# Patient Record
Sex: MALE | Race: ASIAN | NOT HISPANIC OR LATINO | Employment: FULL TIME | URBAN - METROPOLITAN AREA
[De-identification: names, ages, dates, MRNs, and addresses within clinical notes are randomized per-mention and may not be internally consistent; named-entity substitution may affect disease eponyms.]

---

## 2020-10-02 ENCOUNTER — RECORDS - HEALTHEAST (OUTPATIENT)
Dept: GENERAL RADIOLOGY | Facility: CLINIC | Age: 50
End: 2020-10-02

## 2020-10-02 ENCOUNTER — OFFICE VISIT - HEALTHEAST (OUTPATIENT)
Dept: FAMILY MEDICINE | Facility: CLINIC | Age: 50
End: 2020-10-02

## 2020-10-02 DIAGNOSIS — J45.901 EXACERBATION OF ASTHMA, UNSPECIFIED ASTHMA SEVERITY, UNSPECIFIED WHETHER PERSISTENT: ICD-10-CM

## 2020-10-02 DIAGNOSIS — R03.0 ELEVATED BLOOD PRESSURE READING WITHOUT DIAGNOSIS OF HYPERTENSION: ICD-10-CM

## 2020-10-02 DIAGNOSIS — Z23 NEED FOR PROPHYLACTIC VACCINATION AND INOCULATION AGAINST INFLUENZA: ICD-10-CM

## 2020-10-02 DIAGNOSIS — R07.89 OTHER CHEST PAIN: ICD-10-CM

## 2020-10-02 DIAGNOSIS — Z00.00 HEALTHCARE MAINTENANCE: ICD-10-CM

## 2020-10-02 DIAGNOSIS — R07.89 ATYPICAL CHEST PAIN: ICD-10-CM

## 2020-10-02 LAB
ALBUMIN UR-MCNC: NEGATIVE MG/DL
ANION GAP SERPL CALCULATED.3IONS-SCNC: 13 MMOL/L (ref 5–18)
APPEARANCE UR: CLEAR
BILIRUB UR QL STRIP: NEGATIVE
BUN SERPL-MCNC: 11 MG/DL (ref 8–22)
CALCIUM SERPL-MCNC: 9.5 MG/DL (ref 8.5–10.5)
CHLORIDE BLD-SCNC: 104 MMOL/L (ref 98–107)
CHOLEST SERPL-MCNC: 215 MG/DL
CO2 SERPL-SCNC: 23 MMOL/L (ref 22–31)
COLOR UR AUTO: YELLOW
CREAT SERPL-MCNC: 0.75 MG/DL (ref 0.7–1.3)
D DIMER PPP FEU-MCNC: <=0.27 FEU UG/ML
ERYTHROCYTE [DISTWIDTH] IN BLOOD BY AUTOMATED COUNT: 11.6 % (ref 11–14.5)
ERYTHROCYTE [SEDIMENTATION RATE] IN BLOOD BY WESTERGREN METHOD: 2 MM/HR (ref 0–15)
FASTING STATUS PATIENT QL REPORTED: YES
GFR SERPL CREATININE-BSD FRML MDRD: >60 ML/MIN/1.73M2
GLUCOSE BLD-MCNC: 102 MG/DL (ref 70–125)
GLUCOSE UR STRIP-MCNC: NEGATIVE MG/DL
HCT VFR BLD AUTO: 45.4 % (ref 40–54)
HDLC SERPL-MCNC: 41 MG/DL
HGB BLD-MCNC: 14.8 G/DL (ref 14–18)
HGB UR QL STRIP: NEGATIVE
HIV 1+2 AB+HIV1 P24 AG SERPL QL IA: NEGATIVE
KETONES UR STRIP-MCNC: NEGATIVE MG/DL
LEUKOCYTE ESTERASE UR QL STRIP: NEGATIVE
MCH RBC QN AUTO: 31.9 PG (ref 27–34)
MCHC RBC AUTO-ENTMCNC: 32.7 G/DL (ref 32–36)
MCV RBC AUTO: 98 FL (ref 80–100)
NITRATE UR QL: NEGATIVE
PH UR STRIP: 7 [PH] (ref 5–8)
PLATELET # BLD AUTO: 255 THOU/UL (ref 140–440)
PMV BLD AUTO: 8.2 FL (ref 7–10)
POTASSIUM BLD-SCNC: 3.9 MMOL/L (ref 3.5–5)
RBC # BLD AUTO: 4.65 MILL/UL (ref 4.4–6.2)
SODIUM SERPL-SCNC: 140 MMOL/L (ref 136–145)
SP GR UR STRIP: 1.02 (ref 1–1.03)
TROPONIN I SERPL-MCNC: <0.01 NG/ML (ref 0–0.29)
UROBILINOGEN UR STRIP-ACNC: NORMAL
WBC: 6.3 THOU/UL (ref 4–11)

## 2020-10-02 ASSESSMENT — MIFFLIN-ST. JEOR: SCORE: 1616.5

## 2020-10-02 NOTE — ASSESSMENT & PLAN NOTE
New diagnosis moderatehypertension    Factors/lifestyle that may contribute: None identified  Assessment of risk of vascular disease: Unclear at this time  Labs to work up secondary causes done today?  Yes  EKG done today or recently? Yes: Was normal  Plan:   Discussed diet and exercise: No inadequate time medication prescribed this visit? Yes: Amlodipine 5 mg  Rx writtenfor home blood pressure monitor? No  Follow-up: 2weeks

## 2020-10-02 NOTE — ASSESSMENT & PLAN NOTE
Etiology of chest pain is not known  Broad differential diagnosis includes:  Cardiac ischemia: low on sharp nature and pleuritic  PE: Intermediate likelihood: Strategy is d-dimer, plan PE pulmonary angiogram if elevated  Chest wall/ musculoskeletal: high/likelihood: Although not reproducible  Gastrointestinal: lowlikelihood: Based on symptoms  Anxiety: lowlikelihood: Based on symptoms    Other: (pericarditis, thoracic aortic dissection, CHF, other pulmonary sources); low likelihood as they do not fit clinical presentation well   Plan: Chest x-ray, EKG, troponin, d-dimer done today.  Await results.  Recommend analgesic.  Follow-up 2 weeks.

## 2020-10-05 LAB
HAV IGM SERPL QL IA: NEGATIVE
HBV CORE IGM SERPL QL IA: NEGATIVE
HBV SURFACE AG SERPL QL IA: NEGATIVE
HCV AB SERPL QL IA: NEGATIVE

## 2020-10-06 ENCOUNTER — COMMUNICATION - HEALTHEAST (OUTPATIENT)
Dept: FAMILY MEDICINE | Facility: CLINIC | Age: 50
End: 2020-10-06

## 2020-10-06 LAB
ATRIAL RATE - MUSE: 67 BPM
DIASTOLIC BLOOD PRESSURE - MUSE: NORMAL
INTERPRETATION ECG - MUSE: NORMAL
P AXIS - MUSE: 60 DEGREES
PR INTERVAL - MUSE: 226 MS
QRS DURATION - MUSE: 88 MS
QT - MUSE: 408 MS
QTC - MUSE: 431 MS
R AXIS - MUSE: 66 DEGREES
SYSTOLIC BLOOD PRESSURE - MUSE: NORMAL
T AXIS - MUSE: 43 DEGREES
VENTRICULAR RATE- MUSE: 67 BPM

## 2020-10-26 ENCOUNTER — AMBULATORY - HEALTHEAST (OUTPATIENT)
Dept: NURSING | Facility: CLINIC | Age: 50
End: 2020-10-26

## 2020-10-26 ENCOUNTER — COMMUNICATION - HEALTHEAST (OUTPATIENT)
Dept: FAMILY MEDICINE | Facility: CLINIC | Age: 50
End: 2020-10-26

## 2020-10-26 DIAGNOSIS — J45.901 EXACERBATION OF ASTHMA, UNSPECIFIED ASTHMA SEVERITY, UNSPECIFIED WHETHER PERSISTENT: ICD-10-CM

## 2020-10-26 DIAGNOSIS — R03.0 ELEVATED BLOOD PRESSURE READING WITHOUT DIAGNOSIS OF HYPERTENSION: ICD-10-CM

## 2020-10-26 RX ORDER — ALBUTEROL SULFATE 90 UG/1
2 AEROSOL, METERED RESPIRATORY (INHALATION) EVERY 6 HOURS PRN
Qty: 1 EACH | Refills: 3 | Status: SHIPPED | OUTPATIENT
Start: 2020-10-26

## 2021-04-25 ENCOUNTER — COMMUNICATION - HEALTHEAST (OUTPATIENT)
Dept: FAMILY MEDICINE | Facility: CLINIC | Age: 51
End: 2021-04-25

## 2021-04-25 DIAGNOSIS — R03.0 ELEVATED BLOOD PRESSURE READING WITHOUT DIAGNOSIS OF HYPERTENSION: ICD-10-CM

## 2021-04-26 RX ORDER — AMLODIPINE BESYLATE 5 MG/1
TABLET ORAL
Qty: 30 TABLET | Refills: 6 | Status: SHIPPED | OUTPATIENT
Start: 2021-04-26

## 2021-05-27 VITALS — SYSTOLIC BLOOD PRESSURE: 126 MMHG | DIASTOLIC BLOOD PRESSURE: 82 MMHG | HEART RATE: 80 BPM

## 2021-06-05 VITALS
WEIGHT: 177 LBS | DIASTOLIC BLOOD PRESSURE: 94 MMHG | RESPIRATION RATE: 20 BRPM | TEMPERATURE: 97.7 F | SYSTOLIC BLOOD PRESSURE: 152 MMHG | OXYGEN SATURATION: 98 % | HEIGHT: 67 IN | BODY MASS INDEX: 27.78 KG/M2 | HEART RATE: 77 BPM

## 2021-06-12 NOTE — PROGRESS NOTES
Assessment/ Plan  Problem List Items Addressed This Visit        Unprioritized    Atypical chest pain - Primary     Etiology of chest pain is not known  Broad differential diagnosis includes:  Cardiac ischemia: low on sharp nature and pleuritic  PE: Intermediate likelihood: Strategy is d-dimer, plan PE pulmonary angiogram if elevated  Chest wall/ musculoskeletal: high/likelihood: Although not reproducible  Gastrointestinal: lowlikelihood: Based on symptoms  Anxiety: lowlikelihood: Based on symptoms    Other: (pericarditis, thoracic aortic dissection, CHF, other pulmonary sources); low likelihood as they do not fit clinical presentation well   Plan: Chest x-ray, EKG, troponin, d-dimer done today.  Await results.  Recommend analgesic.  Follow-up 2 weeks.             Relevant Orders    D-dimer, Quantitative    Erythrocyte Sedimentation Rate    HM2(CBC w/o Differential) (Completed)    Troponin I    XR Chest 2 Views (Completed)    Electrocardiogram Perform - Clinic (Completed)    Urinalysis-UC if Indicated (Completed)    Elevated blood pressure reading without diagnosis of hypertension       New diagnosis moderatehypertension    Factors/lifestyle that may contribute: None identified  Assessment of risk of vascular disease: Unclear at this time  Labs to work up secondary causes done today?  Yes  EKG done today or recently? Yes: Was normal  Plan:   Discussed diet and exercise: No inadequate time medication prescribed this visit? Yes: Amlodipine 5 mg  Rx written for home blood pressure monitor? No  Follow-up: 2weeks                 Relevant Medications    amLODIPine (NORVASC) 5 MG tablet    Other Relevant Orders    Basic Metabolic Panel    Electrocardiogram Perform - Clinic (Completed)    Urinalysis-UC if Indicated (Completed)    Healthcare maintenance     IV, influenza vaccine given tetanus shot given         Relevant Orders    HIV Antigen/Antibody Screening [place]    Hepatitis Acute Evaluation    Cholesterol, Total     HDL Cholesterol    Need for prophylactic vaccination and inoculation against influenza    Relevant Orders    Influenza, Recombinant, Inj, Quadrivalent, PF, 18+YRS (Completed)    Exacerbation of asthma, unspecified asthma severity, unspecified whether persistent     Actually stable asthma, unclear severity, patient has been using albuterol.  This was renewed         Relevant Medications    albuterol (PROAIR HFA;PROVENTIL HFA;VENTOLIN HFA) 90 mcg/actuation inhaler        Subjective  CC:  Chief Complaint   Patient presents with     Annual Exam     chest and back pain     HPI:  Very difficult history today, video /new patient  Patient here for chest and back pain, needs to get inhaler renewed.  History of asthma.  Chest Pain  Narrative: sharp andt chest pain with breathing  ---------------  When / onset/  Duration?  1month(s)  Episodic 3min - 2 hours  Quality : sharp, with trying to breath  Severity ?  moderate  Location? retrosternal  Trigger? breathing Occurred at rest or with exertion?  both  Resolution, anything seem to make it better?  No- aybe MDI someitmes  Pleuritic or with movement? Pleuritic not movement  Associated with eating/ swallowing? No  A/w SOB?  No  Diaphoresis? No  Palpitations or dizziness? No    H/o asthma, no recent symptoms.    Patient is a new patient to us.  He came from [place] 2 years ago and this is the first Dr. Abraham ever seen.  Non-smoker.  Drinks alcohol socially.  He of gout as well.  Not taking any medications except for the inhaler.    There is a family history of hypertension.    PFSH:  Patient Active Problem List   Diagnosis     Atypical chest pain     Elevated blood pressure reading without diagnosis of hypertension     Healthcare maintenance     Need for prophylactic vaccination and inoculation against influenza     Exacerbation of asthma, unspecified asthma severity, unspecified whether persistent     Current medications reviewed as follows:  No current outpatient  "medications on file prior to visit.     No current facility-administered medications on file prior to visit.      Social History     Tobacco Use   Smoking Status Never Smoker   Smokeless Tobacco Never Used     Social History     Social History Narrative    Immigrated from [place] in 2018 or thereabouts.  Lives here with his son    Patient is working, I believe in a factory     Patient Care Team:  Provider, No Primary Care as PCP - General  ROS  Full 10 system review including constitutional, respiratory, cardiac, gi, urinary, rheumatologic, neurologic, reproductive, dermatologic psychiatric is  performed  and is otherwise negative         Objective  Physical Exam  Vitals:    10/02/20 1036 10/02/20 1110   BP: (!) 159/93 (!) 152/94   Patient Site: Right Arm Right Arm   Patient Position: Sitting Sitting   Cuff Size: Adult Regular Adult Regular   Pulse: 82 77   Resp: 20    Temp: 97.7  F (36.5  C)    TempSrc: Tympanic    SpO2:  98%   Weight: 177 lb (80.3 kg)    Height: 5' 7\" (1.702 m)      Body mass index is 27.72 kg/m .  Gen- alert, oriented  No acute distress  HEENT- normal cephalic, atraumatic.   Chest- Normal inspiration and expiration.    Clear to ascultation.    No chest wall deformity or scar.  No reproducible chest wall tenderness  CV- Heart regular rate and rhythm  normal tones, no murmurs   No gallops or rubs.  Upper abdomen is not tender on palpation  Ext-no cyanosis   No edema  Skin- warm and dry,   no visualized rash    Diagnostics:   Results for orders placed or performed in visit on 10/02/20   HM2(CBC w/o Differential)   Result Value Ref Range    WBC 6.3 4.0 - 11.0 thou/uL    RBC 4.65 4.40 - 6.20 mill/uL    Hemoglobin 14.8 14.0 - 18.0 g/dL    Hematocrit 45.4 40.0 - 54.0 %    MCV 98 80 - 100 fL    MCH 31.9 27.0 - 34.0 pg    MCHC 32.7 32.0 - 36.0 g/dL    RDW 11.6 11.0 - 14.5 %    Platelets 255 140 - 440 thou/uL    MPV 8.2 7.0 - 10.0 fL   Urinalysis-UC if Indicated   Result Value Ref Range    Color, UA Yellow " Colorless, Yellow, Straw, Light Yellow    Clarity, UA Clear Clear    Glucose, UA Negative Negative    Bilirubin, UA Negative Negative    Ketones, UA Negative Negative    Specific Gravity, UA 1.020 1.005 - 1.030    Blood, UA Negative Negative    pH, UA 7.0 5.0 - 8.0    Protein, UA Negative Negative mg/dL    Urobilinogen, UA 0.2 E.U./dL 0.2 E.U./dL, 1.0 E.U./dL    Nitrite, UA Negative Negative    Leukocytes, UA Negative Negative   Electrocardiogram Perform - Clinic   Result Value Ref Range    SYSTOLIC BLOOD PRESSURE      DIASTOLIC BLOOD PRESSURE      VENTRICULAR RATE 67 BPM    ATRIAL RATE 67 BPM    P-R INTERVAL 226 ms    QRS DURATION 88 ms    Q-T INTERVAL 408 ms    QTC CALCULATION (BEZET) 431 ms    P Axis 60 degrees    R AXIS 66 degrees    T AXIS 43 degrees    MUSE DIAGNOSIS       Sinus rhythm with 1st degree A-V block  Otherwise normal ECG  No previous ECGs available       Personally reviewed and agree      Please note: Voice recognition software was used in this dictation.  It may therefore contain typographical errors.

## 2021-06-12 NOTE — TELEPHONE ENCOUNTER
DOD    Are you able to take care of this or can this wait for Dr. Barth tomorrow?     Patient came in for a bp check and will like his albuterol inhaler refill. Please send refill to [facility] on [address]. He will like a 3 months supply of his inhaler. Patient stated that he has been using his inhaler more often these past couple of weeks.     Please also review labs that were done on 10/02/20 and advise. Patient will like to know his results.

## 2021-06-16 PROBLEM — R07.89 ATYPICAL CHEST PAIN: Status: ACTIVE | Noted: 2020-10-02

## 2021-06-16 PROBLEM — Z00.00 HEALTHCARE MAINTENANCE: Status: ACTIVE | Noted: 2020-10-02

## 2021-06-16 PROBLEM — J45.901 EXACERBATION OF ASTHMA, UNSPECIFIED ASTHMA SEVERITY, UNSPECIFIED WHETHER PERSISTENT: Status: ACTIVE | Noted: 2020-10-02

## 2021-06-16 PROBLEM — R03.0 ELEVATED BLOOD PRESSURE READING WITHOUT DIAGNOSIS OF HYPERTENSION: Status: ACTIVE | Noted: 2020-10-02

## 2021-06-16 PROBLEM — Z23 NEED FOR PROPHYLACTIC VACCINATION AND INOCULATION AGAINST INFLUENZA: Status: ACTIVE | Noted: 2020-10-02

## 2021-06-20 NOTE — LETTER
Letter by Kwadwo Barth MD at      Author: Kwadwo Barth MD Service: -- Author Type: --    Filed:  Encounter Date: 10/6/2020 Status: (Other)         Andrzej Armstrong  [address] [place] [address]             October 6, 2020         Dear Mr. Armstrong,    Below are the results from your recent visit:    Resulted Orders   HIV Antigen/Antibody Screening [place]   Result Value Ref Range    HIV Antigen / Antibody Negative Negative    Narrative    Method is Abbott HIV Ag/Ab for the detection of HIV p24 antigen, HIV-1 antibodies and HIV-2 antibodies.   Basic Metabolic Panel   Result Value Ref Range    Sodium 140 136 - 145 mmol/L    Potassium 3.9 3.5 - 5.0 mmol/L    Chloride 104 98 - 107 mmol/L    CO2 23 22 - 31 mmol/L    Anion Gap, Calculation 13 5 - 18 mmol/L    Glucose 102 70 - 125 mg/dL    Calcium 9.5 8.5 - 10.5 mg/dL    BUN 11 8 - 22 mg/dL    Creatinine 0.75 0.70 - 1.30 mg/dL    GFR MDRD Af Amer >60 >60 mL/min/1.73m2    GFR MDRD Non Af Amer >60 >60 mL/min/1.73m2    Narrative    Fasting Glucose reference range is 70-99 mg/dL per  American Diabetes Association (ADA) guidelines.   D-dimer, Quantitative   Result Value Ref Range    D-Dimer, Quant <=0.27 <=0.50 FEU ug/mL    Narrative    0.50 ug/mL(FEU) = cutoff value for exclusion of DVT/PE   Erythrocyte Sedimentation Rate   Result Value Ref Range    Sed Rate 2 0 - 15 mm/hr   HM2(CBC w/o Differential)   Result Value Ref Range    WBC 6.3 4.0 - 11.0 thou/uL    RBC 4.65 4.40 - 6.20 mill/uL    Hemoglobin 14.8 14.0 - 18.0 g/dL    Hematocrit 45.4 40.0 - 54.0 %    MCV 98 80 - 100 fL    MCH 31.9 27.0 - 34.0 pg    MCHC 32.7 32.0 - 36.0 g/dL    RDW 11.6 11.0 - 14.5 %    Platelets 255 140 - 440 thou/uL    MPV 8.2 7.0 - 10.0 fL   Troponin I   Result Value Ref Range    Troponin I <0.01 0.00 - 0.29 ng/mL   Urinalysis-UC if Indicated   Result Value Ref Range    Color, UA Yellow Colorless, Yellow, Straw, Light Yellow    Clarity, UA Clear Clear    Glucose, UA Negative  Negative    Bilirubin, UA Negative Negative    Ketones, UA Negative Negative    Specific Gravity, UA 1.020 1.005 - 1.030    Blood, UA Negative Negative    pH, UA 7.0 5.0 - 8.0    Protein, UA Negative Negative mg/dL    Urobilinogen, UA 0.2 E.U./dL 0.2 E.U./dL, 1.0 E.U./dL    Nitrite, UA Negative Negative    Leukocytes, UA Negative Negative    Narrative    Microscopic not indicated  UC not indicated   Hepatitis Acute Evaluation   Result Value Ref Range    Hepatitis A Antibody, IgM Negative Negative    Hepatitis B Core Helene, IgM Negative Negative    Hepatitis B Surface Ag Negative Negative    Hepatitis C Ab Negative Negative   Cholesterol, Total   Result Value Ref Range    Cholesterol 215 (H) <=199 mg/dL   HDL Cholesterol   Result Value Ref Range    HDL Cholesterol 41 >=40 mg/dL    Patient Fasting > 8hrs? Yes        Blood tests look good Anrdzej.  Cholesterol is a little high.  We should see you back in the next two weeks to see how you are doing.    Please call with questions or contact us using [facility].    Sincerely,        Electronically signed by Kwadwo Barth MD

## 2021-06-29 NOTE — PROGRESS NOTES
Progress Notes by Rocio Bowers CMA at 10/26/2020  3:15 PM     Author: Rocio Bowers CMA Service: -- Author Type: Medical Assistant    Filed: 10/26/2020  3:15 PM Encounter Date: 10/26/2020 Status: Attested    : Rocio Bowers CMA (Medical Assistant) Cosigner: Cynthia Haji MD at 10/26/2020  3:54 PM    Attestation signed by Cynthia Haji MD at 10/26/2020  3:54 PM    ok                I met with Andrzej Armstrong at the request of Dr. Barth to recheck his blood pressure.  Blood pressure medications on the MAR were reviewed with patient.    Patient has taken all medications as per usual regimen: Yes  Patient reports tolerating them without any issues or concerns: No    Vitals:    10/26/20 1505   BP: 126/82   Patient Site: Left Arm   Patient Position: Sitting   Cuff Size: Adult Regular   Pulse: 80       Blood pressure was taken, previous encounter was reviewed, recorded blood pressure below 140/90.  Patient was discharged and the note will be sent to the provider for final review.

## 2021-09-02 ENCOUNTER — CONTACT MOVED (OUTPATIENT)
Age: 51
End: 2021-09-02
Payer: COMMERCIAL

## 2021-09-08 ENCOUNTER — OFFICE VISIT (OUTPATIENT)
Dept: FAMILY MEDICINE | Facility: CLINIC | Age: 51
End: 2021-09-08
Payer: COMMERCIAL

## 2021-09-08 VITALS
WEIGHT: 178 LBS | OXYGEN SATURATION: 98 % | HEART RATE: 78 BPM | DIASTOLIC BLOOD PRESSURE: 92 MMHG | BODY MASS INDEX: 27.88 KG/M2 | SYSTOLIC BLOOD PRESSURE: 146 MMHG

## 2021-09-08 DIAGNOSIS — Z00.00 HEALTHCARE MAINTENANCE: ICD-10-CM

## 2021-09-08 DIAGNOSIS — Z12.11 COLON CANCER SCREENING: ICD-10-CM

## 2021-09-08 DIAGNOSIS — R51.9 ACUTE NONINTRACTABLE HEADACHE, UNSPECIFIED HEADACHE TYPE: ICD-10-CM

## 2021-09-08 DIAGNOSIS — J45.40 MODERATE PERSISTENT ASTHMA WITHOUT COMPLICATION: ICD-10-CM

## 2021-09-08 DIAGNOSIS — I10 ESSENTIAL HYPERTENSION, BENIGN: Primary | ICD-10-CM

## 2021-09-08 LAB
ANION GAP SERPL CALCULATED.3IONS-SCNC: 15 MMOL/L (ref 5–18)
BUN SERPL-MCNC: 10 MG/DL (ref 8–22)
CALCIUM SERPL-MCNC: 9.7 MG/DL (ref 8.5–10.5)
CHLORIDE BLD-SCNC: 104 MMOL/L (ref 98–107)
CO2 SERPL-SCNC: 21 MMOL/L (ref 22–31)
CREAT SERPL-MCNC: 0.79 MG/DL (ref 0.7–1.3)
GFR SERPL CREATININE-BSD FRML MDRD: >90 ML/MIN/1.73M2
GLUCOSE BLD-MCNC: 108 MG/DL (ref 70–125)
POTASSIUM BLD-SCNC: 3.8 MMOL/L (ref 3.5–5)
SODIUM SERPL-SCNC: 140 MMOL/L (ref 136–145)

## 2021-09-08 PROCEDURE — 80048 BASIC METABOLIC PNL TOTAL CA: CPT | Performed by: FAMILY MEDICINE

## 2021-09-08 PROCEDURE — 99214 OFFICE O/P EST MOD 30 MIN: CPT | Performed by: FAMILY MEDICINE

## 2021-09-08 PROCEDURE — 36415 COLL VENOUS BLD VENIPUNCTURE: CPT | Performed by: FAMILY MEDICINE

## 2021-09-08 RX ORDER — AMLODIPINE BESYLATE 10 MG/1
10 TABLET ORAL DAILY
Qty: 30 TABLET | Refills: 3 | Status: SHIPPED | OUTPATIENT
Start: 2021-09-08 | End: 2022-01-16

## 2021-09-08 RX ORDER — BUDESONIDE AND FORMOTEROL FUMARATE DIHYDRATE 80; 4.5 UG/1; UG/1
2 AEROSOL RESPIRATORY (INHALATION) 2 TIMES DAILY
Qty: 10.2 G | Refills: 3 | Status: SHIPPED | OUTPATIENT
Start: 2021-09-08 | End: 2022-01-06

## 2021-09-08 ASSESSMENT — ASTHMA QUESTIONNAIRES
QUESTION_1 LAST FOUR WEEKS HOW MUCH OF THE TIME DID YOUR ASTHMA KEEP YOU FROM GETTING AS MUCH DONE AT WORK, SCHOOL OR AT HOME: MOST OF THE TIME
QUESTION_4 LAST FOUR WEEKS HOW OFTEN HAVE YOU USED YOUR RESCUE INHALER OR NEBULIZER MEDICATION (SUCH AS ALBUTEROL): THREE OR MORE TIMES PER DAY
QUESTION_5 LAST FOUR WEEKS HOW WOULD YOU RATE YOUR ASTHMA CONTROL: POORLY CONTROLLED
ACT_TOTALSCORE: 10
QUESTION_2 LAST FOUR WEEKS HOW OFTEN HAVE YOU HAD SHORTNESS OF BREATH: THREE TO SIX TIMES A WEEK
QUESTION_3 LAST FOUR WEEKS HOW OFTEN DID YOUR ASTHMA SYMPTOMS (WHEEZING, COUGHING, SHORTNESS OF BREATH, CHEST TIGHTNESS OR PAIN) WAKE YOU UP AT NIGHT OR EARLIER THAN USUAL IN THE MORNING: TWO OR THREE NIGHTS A WEEK

## 2021-09-08 NOTE — LETTER
September 9, 2021      Andrzej Armstrong  [address] [place] [address]        Dear ,    We are writing to inform you of your test results.    Andrzej, your recent test results were okay.        Resulted Orders   Basic metabolic panel  (Ca, Cl, CO2, Creat, Gluc, K, Na, BUN)   Result Value Ref Range    Sodium 140 136 - 145 mmol/L    Potassium 3.8 3.5 - 5.0 mmol/L    Chloride 104 98 - 107 mmol/L    Carbon Dioxide (CO2) 21 (L) 22 - 31 mmol/L    Anion Gap 15 5 - 18 mmol/L    Urea Nitrogen 10 8 - 22 mg/dL    Creatinine 0.79 0.70 - 1.30 mg/dL    Calcium 9.7 8.5 - 10.5 mg/dL    Glucose 108 70 - 125 mg/dL    GFR Estimate >90 >60 mL/min/1.73m2      Comment:      As of July 11, 2021, eGFR is calculated by the CKD-EPI creatinine equation, without race adjustment. eGFR can be influenced by muscle mass, exercise, and diet. The reported eGFR is an estimation only and is only applicable if the renal function is stable.       If you have any questions or concerns, please call the clinic at the number listed above.       Sincerely,      Kwadwo Barth MD

## 2021-09-08 NOTE — ASSESSMENT & PLAN NOTE
Probably tension headache but increased with Valsalva, coughing and sneezing.  MRI  No noted papilledema

## 2021-09-08 NOTE — PROGRESS NOTES
Assessment/ Plan  Healthcare maintenance  Up-to-date on vaccines,    Acute nonintractable headache, unspecified headache type  Probably tension headache but increased with Valsalva, coughing and sneezing.  MRI  No noted papilledema    Moderate persistent asthma without complication  Add Symbicort, continue albuterol, follow-up 3 to 4 weeks    Essential hypertension, benign  Poorly controlled, increase amlodipine to 10 mg, follow-up 3 to 4 weeks.  Check BMP     Subjective  CC:  chief complaint  HPI:  51-year-old  Here for follow-up of hypertension.  Seen about a year ago, started on medication for elevated blood pressure  Told to follow-up in 2 weeks    Doing well until developed a headache about 2 weeks ago  Came on suddenly after taking a shower, it sounds like it was a cold shower  Frontal  Moderate in severity  No neurologic symptoms  Increased pain with coughing and sneezing  No nausea or vomiting  Never has had similar problem before    More problems with wheezing lately.  ACT= 10  PFSH:  Patient Active Problem List   Diagnosis     Atypical chest pain     Elevated blood pressure reading without diagnosis of hypertension     Healthcare maintenance     Need for prophylactic vaccination and inoculation against influenza     Exacerbation of asthma, unspecified asthma severity, unspecified whether persistent     Gastrointestinal hemorrhage, unspecified gastrointestinal hemorrhage type     Anemia, unspecified type     Acute nonintractable headache, unspecified headache type     Moderate persistent asthma without complication     Essential hypertension, benign     albuterol (PROAIR HFA;PROVENTIL HFA;VENTOLIN HFA) 90 mcg/actuation inhaler, [ALBUTEROL (PROAIR HFA;PROVENTIL HFA;VENTOLIN HFA) 90 MCG/ACTUATION INHALER] Inhale 2 puffs every 6 (six) hours as needed for wheezing.  amLODIPine (NORVASC) 5 MG tablet, [AMLODIPINE (NORVASC) 5 MG TABLET] TAKE 1 TABLET BY MOUTH EVERY DAY    No current facility-administered  medications on file prior to visit.       History   Smoking Status     Never Smoker   Smokeless Tobacco     Never Used     Social History     Social History Narrative    Immigrated from [place] in 2018 or thereabouts.  Lives here with his son  Patient is working, I believe in a factory     Patient Care Team:  System, Provider Not In as PCP - General (Clinic)  Kwadwo Barth MD as Assigned PCP  ROS  As above      Objective  Physical Exam  Vitals:    09/08/21 1711   BP: (!) 152/90   BP Location: Left arm   Patient Position: Sitting   Cuff Size: Adult Regular   Pulse: 78   SpO2: 98%   Weight: 80.7 kg (178 lb)     Body mass index is 27.88 kg/m .  Cranial nerves II through XII intact.  Optic fundi normal.  No papilledema.  Strength tested in upper extremities and is normal.  Coordination intact.  Gait normal.  Romberg negative.  patellar, ankle reflexes tested and are normal.  Bilateral expiratory wheezes  Cardiovascular exam reveals regular rate and rhythm normal tones no murmurs, gallops, rubs.  No elevated JVP  No lower extremity edema    Head is without trauma, no reproducible pain  Diagnostics:   No results found for any visits on 09/08/21.        Please note: Voice recognition software was used in this dictation.  It may therefore contain typographical errors.

## 2021-09-09 ASSESSMENT — ASTHMA QUESTIONNAIRES: ACT_TOTALSCORE: 10

## 2021-09-17 ENCOUNTER — APPOINTMENT (OUTPATIENT)
Dept: INTERPRETER SERVICES | Facility: CLINIC | Age: 51
End: 2021-09-17
Payer: COMMERCIAL

## 2021-09-24 ENCOUNTER — HOSPITAL ENCOUNTER (OUTPATIENT)
Dept: MRI IMAGING | Facility: CLINIC | Age: 51
Discharge: HOME | End: 2021-09-24
Attending: FAMILY MEDICINE | Admitting: FAMILY MEDICINE
Payer: COMMERCIAL

## 2021-09-24 DIAGNOSIS — R51.9 ACUTE NONINTRACTABLE HEADACHE, UNSPECIFIED HEADACHE TYPE: ICD-10-CM

## 2021-09-24 PROCEDURE — A9585 GADOBUTROL INJECTION: HCPCS | Performed by: FAMILY MEDICINE

## 2021-09-24 PROCEDURE — 70553 MRI BRAIN STEM W/O & W/DYE: CPT

## 2021-09-24 PROCEDURE — 255N000002 HC RX 255 OP 636: Performed by: FAMILY MEDICINE

## 2021-09-24 RX ORDER — GADOBUTROL 604.72 MG/ML
0.1 INJECTION INTRAVENOUS ONCE
Status: COMPLETED | OUTPATIENT
Start: 2021-09-24 | End: 2021-09-24

## 2021-09-24 RX ADMIN — GADOBUTROL 8 ML: 604.72 INJECTION INTRAVENOUS at 18:14

## 2021-09-28 ENCOUNTER — TELEPHONE (OUTPATIENT)
Dept: FAMILY MEDICINE | Facility: CLINIC | Age: 51
End: 2021-09-28

## 2021-09-28 NOTE — TELEPHONE ENCOUNTER
----- Message from Kwadwo Barth MD sent at 9/28/2021  7:17 AM CDT -----  Please let him know that his MRI is normal. I think the headache is form muscle tension.  He can use tylenol, follow-up if it is not improving

## 2021-10-08 ENCOUNTER — OFFICE VISIT (OUTPATIENT)
Dept: FAMILY MEDICINE | Facility: CLINIC | Age: 51
End: 2021-10-08
Payer: COMMERCIAL

## 2021-10-08 VITALS
TEMPERATURE: 97.3 F | OXYGEN SATURATION: 98 % | DIASTOLIC BLOOD PRESSURE: 77 MMHG | BODY MASS INDEX: 27.88 KG/M2 | SYSTOLIC BLOOD PRESSURE: 121 MMHG | WEIGHT: 178 LBS | HEART RATE: 73 BPM

## 2021-10-08 DIAGNOSIS — M10.9 ACUTE GOUT, UNSPECIFIED CAUSE, UNSPECIFIED SITE: Primary | ICD-10-CM

## 2021-10-08 DIAGNOSIS — Z12.11 COLON CANCER SCREENING: ICD-10-CM

## 2021-10-08 DIAGNOSIS — Z00.00 HEALTHCARE MAINTENANCE: ICD-10-CM

## 2021-10-08 DIAGNOSIS — R51.9 ACUTE NONINTRACTABLE HEADACHE, UNSPECIFIED HEADACHE TYPE: ICD-10-CM

## 2021-10-08 DIAGNOSIS — J45.40 MODERATE PERSISTENT ASTHMA WITHOUT COMPLICATION: ICD-10-CM

## 2021-10-08 DIAGNOSIS — I10 ESSENTIAL HYPERTENSION, BENIGN: ICD-10-CM

## 2021-10-08 PROCEDURE — 99214 OFFICE O/P EST MOD 30 MIN: CPT | Mod: 25 | Performed by: FAMILY MEDICINE

## 2021-10-08 PROCEDURE — 90682 RIV4 VACC RECOMBINANT DNA IM: CPT | Performed by: FAMILY MEDICINE

## 2021-10-08 PROCEDURE — 90471 IMMUNIZATION ADMIN: CPT | Performed by: FAMILY MEDICINE

## 2021-10-08 PROCEDURE — 90732 PPSV23 VACC 2 YRS+ SUBQ/IM: CPT | Performed by: FAMILY MEDICINE

## 2021-10-08 PROCEDURE — 90472 IMMUNIZATION ADMIN EACH ADD: CPT | Performed by: FAMILY MEDICINE

## 2021-10-08 RX ORDER — COLCHICINE 0.6 MG/1
0.6 TABLET ORAL DAILY
Qty: 10 TABLET | Refills: 1 | Status: SHIPPED | OUTPATIENT
Start: 2021-10-08 | End: 2022-11-22

## 2021-10-08 RX ORDER — COLCHICINE 0.6 MG/1
TABLET ORAL
Qty: 12 TABLET | Refills: 3 | Status: SHIPPED | OUTPATIENT
Start: 2021-10-08

## 2021-10-08 ASSESSMENT — ASTHMA QUESTIONNAIRES
ACT_TOTALSCORE: 12
QUESTION_2 LAST FOUR WEEKS HOW OFTEN HAVE YOU HAD SHORTNESS OF BREATH: NOT AT ALL
QUESTION_4 LAST FOUR WEEKS HOW OFTEN HAVE YOU USED YOUR RESCUE INHALER OR NEBULIZER MEDICATION (SUCH AS ALBUTEROL): THREE OR MORE TIMES PER DAY
QUESTION_1 LAST FOUR WEEKS HOW MUCH OF THE TIME DID YOUR ASTHMA KEEP YOU FROM GETTING AS MUCH DONE AT WORK, SCHOOL OR AT HOME: SOME OF THE TIME
QUESTION_5 LAST FOUR WEEKS HOW WOULD YOU RATE YOUR ASTHMA CONTROL: POORLY CONTROLLED
QUESTION_3 LAST FOUR WEEKS HOW OFTEN DID YOUR ASTHMA SYMPTOMS (WHEEZING, COUGHING, SHORTNESS OF BREATH, CHEST TIGHTNESS OR PAIN) WAKE YOU UP AT NIGHT OR EARLIER THAN USUAL IN THE MORNING: FOUR OR MORE NIGHTS A WEEK

## 2021-10-08 NOTE — ASSESSMENT & PLAN NOTE
Negative MRI  Frontal headache, improving, suspect that this is muscle tension  Discussed ongoing use of naproxen as needed.  Encouraged him to use it 2 or 3 times per week max

## 2021-10-08 NOTE — PROGRESS NOTES
Assessment/ Plan  Healthcare maintenance  Flu vaccine and pneumonia vaccine given.  Referred for colonoscopy.    Acute nonintractable headache, unspecified headache type  Negative MRI  Frontal headache, improving, suspect that this is muscle tension  Discussed ongoing use of naproxen as needed.  Encouraged him to use it 2 or 3 times per week max    Moderate persistent asthma without complication  Doing better on Symbicort  Using Symbicort 3 times a day, will reduce this to 2 times a day, continue albuterol.      Essential hypertension, benign  Well-controlled on 10 mg of amlodipine from 5 mg    Acute gout, unspecified cause, unspecified site  Describes what sounds like gout.  Patient is a fair amount of alcohol by his account  Previously diagnosed and treated with gout in [place]  We will check uric acid  Prescribed empiric colchicine  Discussed option of prophylactic treatment with allopurinol should he have attacks at frequency of more than 2 or 3/year.     Subjective  CC:  chief complaint  HPI:  Patient reports that asthma is significantly better with Symbicort.  However taking 3 times a day rather than 2 times a day as prescribed.    Understands an MRI is normal.  Headaches have gotten less frequent and less severe.  Now only gets them once in a while.  Frontal headache, pressure-like.  Taking Aleve 3-4 times per week.    Requesting colonoscopy.  No family history of colon cancer.  No diarrhea,  occasional constipation with occasional blood after having hard stool upon wiping.    Requesting medication for gout.  Diagnosed with gout in [place].  Swelling of toes.  Further discussion, multiple joints involved, usually 1 or 2 at a time.  Sudden swelling, redness, severe pain.  Medication apparently helped significantly.  Does drink alcohol.    PFSH:  Patient Active Problem List   Diagnosis     Atypical chest pain     Elevated blood pressure reading without diagnosis of hypertension     Healthcare maintenance      Need for prophylactic vaccination and inoculation against influenza     Exacerbation of asthma, unspecified asthma severity, unspecified whether persistent     Gastrointestinal hemorrhage, unspecified gastrointestinal hemorrhage type     Anemia, unspecified type     Acute nonintractable headache, unspecified headache type     Moderate persistent asthma without complication     Essential hypertension, benign     Acute gout, unspecified cause, unspecified site     albuterol (PROAIR HFA;PROVENTIL HFA;VENTOLIN HFA) 90 mcg/actuation inhaler, [ALBUTEROL (PROAIR HFA;PROVENTIL HFA;VENTOLIN HFA) 90 MCG/ACTUATION INHALER] Inhale 2 puffs every 6 (six) hours as needed for wheezing.  amLODIPine (NORVASC) 10 MG tablet, Take 1 tablet (10 mg) by mouth daily  amLODIPine (NORVASC) 5 MG tablet, [AMLODIPINE (NORVASC) 5 MG TABLET] TAKE 1 TABLET BY MOUTH EVERY DAY  budesonide-formoterol (SYMBICORT) 80-4.5 MCG/ACT Inhaler, Inhale 2 puffs into the lungs 2 times daily    No current facility-administered medications on file prior to visit.       History   Smoking Status     Never Smoker   Smokeless Tobacco     Never Used     Social History     Social History Narrative    Immigrated from [place] in 2018 or thereabouts.  Lives here with his son  Patient is working, I believe in a factory     Patient Care Team:  Kwadwo Barth MD as PCP - General (Family Medicine)  Kwadwo Barth MD as Assigned PCP  ROS  As above      Objective  Physical Exam  Vitals:    10/08/21 1623   BP: 121/77   BP Location: Right arm   Patient Position: Sitting   Cuff Size: Adult Regular   Pulse: 73   Temp: 97.3  F (36.3  C)   TempSrc: Temporal   SpO2: 98%   Weight: 80.7 kg (178 lb)     Body mass index is 27.88 kg/m .  Gen- alert, oriented/ appropriately responsive  HEENT- normal cephalic, atraumatic.   No tenderness of neck upon palpation, normal range of motion    Diagnostics:   Reviewed MRI results    FINDINGS:  INTRACRANIAL CONTENTS: No acute or subacute infarct. No  mass, acute hemorrhage, or extra-axial fluid collections. Normal brain parenchymal signal. Normal ventricles and sulci. Normal position of the cerebellar tonsils. No pathologic contrast enhancement.     SELLA: No abnormality accounting for technique.     OSSEOUS STRUCTURES/SOFT TISSUES: Normal marrow signal. The major intracranial vascular flow voids are maintained.      ORBITS: No abnormality accounting for technique. Chronic right lamina papyracea defect.     SINUSES/MASTOIDS: Mild mucosal thickening scattered about the paranasal sinuses. No middle ear or mastoid effusion.                                                                       IMPRESSION:  1.  Normal head MRI. No evidence of increased intracranial pressure.       Please note: Voice recognition software was used in this dictation.  It may therefore contain typographical errors.

## 2021-10-08 NOTE — ASSESSMENT & PLAN NOTE
Doing better on Symbicort  Using Symbicort 3 times a day, will reduce this to 2 times a day, continue albuterol.

## 2021-10-08 NOTE — PATIENT INSTRUCTIONS
pt info  -loose wt, exercise, don t smoke  -eatlots of veggies, they lower uric acid levels  -avoid organ meats, limit beef, lamb, pork, sardines, shellfish.  -avoid soda/ drinks with high fructose corn syrup  -avoid alcohol overuse, no alcohol during attacks  -limit salt

## 2021-10-08 NOTE — ASSESSMENT & PLAN NOTE
Describes what sounds like gout.  Patient is a fair amount of alcohol by his account  Previously diagnosed and treated with gout in [place]  We will check uric acid  Prescribed empiric colchicine  Discussed option of prophylactic treatment with allopurinol should he have attacks at frequency of more than 2 or 3/year.

## 2021-10-09 ASSESSMENT — ASTHMA QUESTIONNAIRES: ACT_TOTALSCORE: 12

## 2021-10-11 ENCOUNTER — LAB (OUTPATIENT)
Dept: LAB | Facility: CLINIC | Age: 51
End: 2021-10-11
Payer: COMMERCIAL

## 2021-10-11 DIAGNOSIS — M10.9 ACUTE GOUT, UNSPECIFIED CAUSE, UNSPECIFIED SITE: ICD-10-CM

## 2021-10-11 LAB — URATE SERPL-MCNC: 6.2 MG/DL (ref 3–8)

## 2021-10-11 PROCEDURE — 84550 ASSAY OF BLOOD/URIC ACID: CPT

## 2021-10-11 PROCEDURE — 36415 COLL VENOUS BLD VENIPUNCTURE: CPT

## 2021-10-29 ENCOUNTER — TRANSFERRED RECORDS (OUTPATIENT)
Dept: HEALTH INFORMATION MANAGEMENT | Facility: CLINIC | Age: 51
End: 2021-10-29

## 2021-11-23 PROBLEM — D12.6 ADENOMATOUS POLYP OF COLON: Status: ACTIVE | Noted: 2021-11-23

## 2022-01-03 DIAGNOSIS — J45.40 MODERATE PERSISTENT ASTHMA WITHOUT COMPLICATION: ICD-10-CM

## 2022-01-06 RX ORDER — BUDESONIDE AND FORMOTEROL FUMARATE DIHYDRATE 80; 4.5 UG/1; UG/1
2 AEROSOL RESPIRATORY (INHALATION) 2 TIMES DAILY
Qty: 10.2 G | Refills: 3 | Status: SHIPPED | OUTPATIENT
Start: 2022-01-06 | End: 2022-04-12

## 2022-01-06 NOTE — TELEPHONE ENCOUNTER
"Routing refill request to provider for review/approval because:  ACT score out of range    Last Written Prescription Date:  9/8/21  Last Fill Quantity: 10.2 g,  # refills: 3   Last office visit provider:  10/8/21     Requested Prescriptions   Pending Prescriptions Disp Refills     budesonide-formoterol (SYMBICORT) 80-4.5 MCG/ACT Inhaler 10.2 g 3     Sig: Inhale 2 puffs into the lungs 2 times daily       Inhaled Steroids Protocol Failed - 1/3/2022  9:58 AM        Failed - Asthma control assessment score within normal limits in last 6 months     Please review ACT score.           Passed - Patient is age 12 or older        Passed - Medication is active on med list        Passed - Recent (6 mo) or future (30 days) visit within the authorizing provider's specialty     Patient had office visit in the last 6 months or has a visit in the next 30 days with authorizing provider or within the authorizing provider's specialty.  See \"Patient Info\" tab in inbasket, or \"Choose Columns\" in Meds & Orders section of the refill encounter.           Long-Acting Beta Agonist Inhalers Protocol  Failed - 1/3/2022  9:58 AM        Failed - Asthma control assessment score within normal limits in last 6 months     Please review ACT score.           Passed - Patient is age 12 or older        Passed - Medication is active on med list        Passed - Recent (6 mo) or future (30 days) visit within the authorizing provider's specialty     Patient had office visit in the last 6 months or has a visit in the next 30 days with authorizing provider or within the authorizing provider's specialty.  See \"Patient Info\" tab in inbasket, or \"Choose Columns\" in Meds & Orders section of the refill encounter.                 Gian Gomez RN 01/06/22 7:28 AM  "

## 2022-01-13 DIAGNOSIS — I10 ESSENTIAL HYPERTENSION, BENIGN: ICD-10-CM

## 2022-01-16 RX ORDER — AMLODIPINE BESYLATE 10 MG/1
10 TABLET ORAL DAILY
Qty: 90 TABLET | Refills: 2 | Status: SHIPPED | OUTPATIENT
Start: 2022-01-16 | End: 2022-09-15

## 2022-01-16 NOTE — TELEPHONE ENCOUNTER
"Last Written Prescription Date:  9/8/21  Last Fill Quantity: 30,  # refills: 3   Last office visit provider:  10/8/21     Requested Prescriptions   Pending Prescriptions Disp Refills     amLODIPine (NORVASC) 10 MG tablet 30 tablet 3     Sig: Take 1 tablet (10 mg) by mouth daily       Calcium Channel Blockers Protocol  Passed - 1/16/2022  9:04 AM        Passed - Blood pressure under 140/90 in past 12 months     BP Readings from Last 3 Encounters:   10/08/21 121/77   09/08/21 (!) 146/92   10/26/20 126/82                 Passed - Recent (12 mo) or future (30 days) visit within the authorizing provider's specialty     Patient has had an office visit with the authorizing provider or a provider within the authorizing providers department within the previous 12 mos or has a future within next 30 days. See \"Patient Info\" tab in inbasket, or \"Choose Columns\" in Meds & Orders section of the refill encounter.              Passed - Medication is active on med list        Passed - Patient is age 18 or older        Passed - Normal serum creatinine on file in past 12 months     Recent Labs   Lab Test 09/08/21  1753   CR 0.79       Ok to refill medication if creatinine is low               Gian oGmez RN 01/16/22 9:13 AM  "

## 2022-04-08 DIAGNOSIS — J45.40 MODERATE PERSISTENT ASTHMA WITHOUT COMPLICATION: ICD-10-CM

## 2022-04-11 NOTE — TELEPHONE ENCOUNTER
"Routing refill request to provider for review/approval because:  Patient needs to be seen because:  Patient hasn't been seen in last 6 months.  Asthma control assessment score not within 6 months.    Last Written Prescription Date:  1/6/2022  Last Fill Quantity: 10.2 g,  # refills: 3   Last office visit provider:  10/8/2021     Requested Prescriptions   Pending Prescriptions Disp Refills     SYMBICORT 80-4.5 MCG/ACT Inhaler [Pharmacy Med Name: SYMBICORT 80/4.5MCG (120  ORAL INH)] 10.2 g 3     Sig: INHALE 2 PUFFS INTO THE LUNGS TWICE DAILY       Inhaled Steroids Protocol Failed - 4/8/2022  3:53 AM        Failed - Asthma control assessment score within normal limits in last 6 months     Please review ACT score.           Failed - Recent (6 mo) or future (30 days) visit within the authorizing provider's specialty     Patient had office visit in the last 6 months or has a visit in the next 30 days with authorizing provider or within the authorizing provider's specialty.  See \"Patient Info\" tab in inbasket, or \"Choose Columns\" in Meds & Orders section of the refill encounter.            Passed - Patient is age 12 or older        Passed - Medication is active on med list       Long-Acting Beta Agonist Inhalers Protocol  Failed - 4/8/2022  3:53 AM        Failed - Asthma control assessment score within normal limits in last 6 months     Please review ACT score.           Failed - Recent (6 mo) or future (30 days) visit within the authorizing provider's specialty     Patient had office visit in the last 6 months or has a visit in the next 30 days with authorizing provider or within the authorizing provider's specialty.  See \"Patient Info\" tab in inbasket, or \"Choose Columns\" in Meds & Orders section of the refill encounter.            Passed - Patient is age 12 or older        Passed - Medication is active on med list             Maya Figueroa RN 04/10/22 11:20 PM  "

## 2022-04-12 RX ORDER — DILTIAZEM HYDROCHLORIDE 60 MG/1
TABLET, FILM COATED ORAL
Qty: 10.2 G | Refills: 3 | Status: SHIPPED | OUTPATIENT
Start: 2022-04-12 | End: 2022-11-21

## 2022-09-14 DIAGNOSIS — I10 ESSENTIAL HYPERTENSION, BENIGN: ICD-10-CM

## 2022-09-15 RX ORDER — AMLODIPINE BESYLATE 10 MG/1
10 TABLET ORAL DAILY
Qty: 90 TABLET | Refills: 2 | Status: SHIPPED | OUTPATIENT
Start: 2022-09-15 | End: 2022-11-22

## 2022-09-15 NOTE — TELEPHONE ENCOUNTER
"Routing refill request to provider for review/approval because:  Labs not current:  Serum creatinine    Last Written Prescription Date: 1/16/2022  Last Fill Quantity: 90,  # refills: 2   Last office visit provider:10/8/2021 with PCP Dr LA Barth     Requested Prescriptions   Pending Prescriptions Disp Refills     amLODIPine (NORVASC) 10 MG tablet 90 tablet 2     Sig: Take 1 tablet (10 mg) by mouth daily       Calcium Channel Blockers Protocol  Failed - 9/14/2022 11:27 AM        Failed - Normal serum creatinine on file in past 12 months     Recent Labs   Lab Test 09/08/21  1753   CR 0.79       Ok to refill medication if creatinine is low          Passed - Blood pressure under 140/90 in past 12 months     BP Readings from Last 3 Encounters:   10/08/21 121/77   09/08/21 (!) 146/92   10/26/20 126/82                 Passed - Recent (12 mo) or future (30 days) visit within the authorizing provider's specialty     Patient has had an office visit with the authorizing provider or a provider within the authorizing providers department within the previous 12 mos or has a future within next 30 days. See \"Patient Info\" tab in inbasket, or \"Choose Columns\" in Meds & Orders section of the refill encounter.              Passed - Medication is active on med list        Passed - Patient is age 18 or older             Lucita Juarez RN 09/14/22 11:22 PM  "

## 2022-11-21 DIAGNOSIS — J45.40 MODERATE PERSISTENT ASTHMA WITHOUT COMPLICATION: ICD-10-CM

## 2022-11-21 DIAGNOSIS — I10 ESSENTIAL HYPERTENSION, BENIGN: ICD-10-CM

## 2022-11-21 DIAGNOSIS — M10.9 ACUTE GOUT, UNSPECIFIED CAUSE, UNSPECIFIED SITE: ICD-10-CM

## 2022-11-22 RX ORDER — COLCHICINE 0.6 MG/1
0.6 TABLET ORAL DAILY
Qty: 10 TABLET | Refills: 1 | Status: SHIPPED | OUTPATIENT
Start: 2022-11-22 | End: 2023-11-21

## 2022-11-22 RX ORDER — BUDESONIDE AND FORMOTEROL FUMARATE DIHYDRATE 80; 4.5 UG/1; UG/1
2 AEROSOL RESPIRATORY (INHALATION) 2 TIMES DAILY
Qty: 10.2 G | Refills: 3 | Status: SHIPPED | OUTPATIENT
Start: 2022-11-22 | End: 2023-09-18

## 2022-11-22 RX ORDER — AMLODIPINE BESYLATE 10 MG/1
10 TABLET ORAL DAILY
Qty: 90 TABLET | Refills: 2 | Status: SHIPPED | OUTPATIENT
Start: 2022-11-22 | End: 2023-12-27

## 2022-11-22 NOTE — TELEPHONE ENCOUNTER
"Routing refill request to provider for review/approval because:  Patient needs to be seen because it has been more than 1 year since last office visit.    Last Written Prescription Date:  10/8/21  Last Fill Quantity: 10,  # refills: 1   Last office visit provider:  10/8/21     Requested Prescriptions   Pending Prescriptions Disp Refills     budesonide-formoterol (SYMBICORT) 80-4.5 MCG/ACT Inhaler 10.2 g 3     Sig: Inhale 2 puffs into the lungs 2 times daily       Inhaled Steroids Protocol Failed - 11/21/2022  4:00 PM        Failed - Asthma control assessment score within normal limits in last 6 months     Please review ACT score.           Failed - Recent (6 mo) or future (30 days) visit within the authorizing provider's specialty     Patient had office visit in the last 6 months or has a visit in the next 30 days with authorizing provider or within the authorizing provider's specialty.  See \"Patient Info\" tab in inbasket, or \"Choose Columns\" in Meds & Orders section of the refill encounter.            Passed - Patient is age 12 or older        Passed - Medication is active on med list       Long-Acting Beta Agonist Inhalers Protocol  Failed - 11/21/2022  4:00 PM        Failed - Asthma control assessment score within normal limits in last 6 months     Please review ACT score.           Failed - Recent (6 mo) or future (30 days) visit within the authorizing provider's specialty     Patient had office visit in the last 6 months or has a visit in the next 30 days with authorizing provider or within the authorizing provider's specialty.  See \"Patient Info\" tab in inbasket, or \"Choose Columns\" in Meds & Orders section of the refill encounter.            Passed - Patient is age 12 or older        Passed - Order for Serevent, Striverdi, or Foradil and pt has steroid inhaler        Passed - Medication is active on med list           colchicine (COLCYRS) 0.6 MG tablet 10 tablet 1     Sig: Take 1 tablet (0.6 mg) by mouth " "daily       Gout Agents Protocol Failed - 11/21/2022  4:00 PM        Failed - CBC on file in past 12 months     Recent Labs   Lab Test 10/02/20  1129   WBC 6.3   RBC 4.65   HGB 14.8   HCT 45.4                    Failed - ALT on file in past 12 months     No lab results found.          Failed - Has Uric Acid on file in past 12 months and value is less than 6     Recent Labs   Lab Test 10/11/21  1645   URIC 6.2     If level is 6mg/dL or greater, ok to refill one time and refer to provider.           Failed - Recent (12 mo) or future (30 days) visit within the authorizing provider's specialty     Patient has had an office visit with the authorizing provider or a provider within the authorizing providers department within the previous 12 mos or has a future within next 30 days. See \"Patient Info\" tab in inbasket, or \"Choose Columns\" in Meds & Orders section of the refill encounter.              Failed - Normal serum creatinine on file in the past 12 months     Recent Labs   Lab Test 09/08/21  1753   CR 0.79       Ok to refill medication if creatinine is low          Passed - Medication is active on med list        Passed - Patient is age 18 or older           amLODIPine (NORVASC) 10 MG tablet 90 tablet 2     Sig: Take 1 tablet (10 mg) by mouth daily       Calcium Channel Blockers Protocol  Failed - 11/21/2022  4:00 PM        Failed - Blood pressure under 140/90 in past 12 months     BP Readings from Last 3 Encounters:   10/08/21 121/77   09/08/21 (!) 146/92   10/26/20 126/82                 Failed - Recent (12 mo) or future (30 days) visit within the authorizing provider's specialty     Patient has had an office visit with the authorizing provider or a provider within the authorizing providers department within the previous 12 mos or has a future within next 30 days. See \"Patient Info\" tab in inbasket, or \"Choose Columns\" in Meds & Orders section of the refill encounter.              Failed - Normal serum " creatinine on file in past 12 months     Recent Labs   Lab Test 09/08/21  1753   CR 0.79       Ok to refill medication if creatinine is low          Passed - Medication is active on med list        Passed - Patient is age 18 or older             Stefani Lopez, RN 11/22/22 1:50 PM

## 2023-09-18 DIAGNOSIS — J45.40 MODERATE PERSISTENT ASTHMA WITHOUT COMPLICATION: ICD-10-CM

## 2023-09-18 RX ORDER — BUDESONIDE AND FORMOTEROL FUMARATE DIHYDRATE 80; 4.5 UG/1; UG/1
2 AEROSOL RESPIRATORY (INHALATION) 2 TIMES DAILY
Qty: 10.2 G | Refills: 3 | Status: SHIPPED | OUTPATIENT
Start: 2023-09-18 | End: 2024-02-26

## 2023-11-20 DIAGNOSIS — M10.9 ACUTE GOUT, UNSPECIFIED CAUSE, UNSPECIFIED SITE: ICD-10-CM

## 2023-11-21 RX ORDER — COLCHICINE 0.6 MG/1
TABLET ORAL
Qty: 10 TABLET | Refills: 1 | Status: SHIPPED | OUTPATIENT
Start: 2023-11-21 | End: 2024-01-22

## 2023-12-24 DIAGNOSIS — I10 ESSENTIAL HYPERTENSION, BENIGN: ICD-10-CM

## 2023-12-27 RX ORDER — AMLODIPINE BESYLATE 10 MG/1
10 TABLET ORAL DAILY
Qty: 90 TABLET | Refills: 2 | Status: SHIPPED | OUTPATIENT
Start: 2023-12-27

## 2024-01-19 DIAGNOSIS — M10.9 ACUTE GOUT, UNSPECIFIED CAUSE, UNSPECIFIED SITE: ICD-10-CM

## 2024-01-19 NOTE — LETTER
January 24, 2024      Andrzej MARTIN Armstrong  [address] [place] [address]        As a valued [facility] patient, your healthcare needs are our priority.    Your health care team has determined that you are due for an appointment for a medication check.   We encourage you to call or schedule an appointment with your primary care provider to discuss your overdue screening and schedule an appointment.     If you already have had your screening performed at another health care facility, please ask that practice to send your results to [facility] [phone] and we will update your health records. This will ensure you receive the best possible care from our providers.      If you have any questions or need help with scheduling, please call the [facility] at [phone].        Yours in health,       Your care team at [facility]       Sincerely,        Kayleigh Jones

## 2024-01-22 RX ORDER — COLCHICINE 0.6 MG/1
TABLET ORAL
Qty: 10 TABLET | Refills: 1 | Status: SHIPPED | OUTPATIENT
Start: 2024-01-22